# Patient Record
Sex: FEMALE | Race: WHITE | Employment: OTHER | URBAN - NONMETROPOLITAN AREA
[De-identification: names, ages, dates, MRNs, and addresses within clinical notes are randomized per-mention and may not be internally consistent; named-entity substitution may affect disease eponyms.]

---

## 2020-01-01 ENCOUNTER — OFFICE VISIT (OUTPATIENT)
Dept: FAMILY MEDICINE | Facility: OTHER | Age: 75
End: 2020-01-01
Attending: NURSE PRACTITIONER
Payer: MEDICARE

## 2020-01-01 ENCOUNTER — TELEPHONE (OUTPATIENT)
Dept: FAMILY MEDICINE | Facility: OTHER | Age: 75
End: 2020-01-01

## 2020-01-01 ENCOUNTER — MEDICAL CORRESPONDENCE (OUTPATIENT)
Dept: HEALTH INFORMATION MANAGEMENT | Facility: CLINIC | Age: 75
End: 2020-01-01

## 2020-01-01 ENCOUNTER — TRANSFERRED RECORDS (OUTPATIENT)
Dept: HEALTH INFORMATION MANAGEMENT | Facility: CLINIC | Age: 75
End: 2020-01-01

## 2020-01-01 ENCOUNTER — OFFICE VISIT (OUTPATIENT)
Dept: DERMATOLOGY | Facility: OTHER | Age: 75
End: 2020-01-01
Attending: DERMATOLOGY
Payer: MEDICARE

## 2020-01-01 ENCOUNTER — HOSPITAL ENCOUNTER (EMERGENCY)
Facility: HOSPITAL | Age: 75
Discharge: HOME | End: 2020-08-06
Attending: NURSE PRACTITIONER | Admitting: NURSE PRACTITIONER
Payer: MEDICARE

## 2020-01-01 ENCOUNTER — OFFICE VISIT (OUTPATIENT)
Dept: FAMILY MEDICINE | Facility: OTHER | Age: 75
End: 2020-01-01
Attending: FAMILY MEDICINE
Payer: MEDICARE

## 2020-01-01 VITALS
HEART RATE: 65 BPM | WEIGHT: 158 LBS | BODY MASS INDEX: 23.95 KG/M2 | DIASTOLIC BLOOD PRESSURE: 86 MMHG | RESPIRATION RATE: 16 BRPM | HEIGHT: 68 IN | SYSTOLIC BLOOD PRESSURE: 142 MMHG | OXYGEN SATURATION: 96 % | TEMPERATURE: 97.2 F

## 2020-01-01 VITALS — DIASTOLIC BLOOD PRESSURE: 70 MMHG | SYSTOLIC BLOOD PRESSURE: 128 MMHG | OXYGEN SATURATION: 97 % | HEART RATE: 70 BPM

## 2020-01-01 VITALS
DIASTOLIC BLOOD PRESSURE: 60 MMHG | RESPIRATION RATE: 16 BRPM | TEMPERATURE: 97.5 F | OXYGEN SATURATION: 98 % | SYSTOLIC BLOOD PRESSURE: 114 MMHG

## 2020-01-01 VITALS
HEIGHT: 68 IN | DIASTOLIC BLOOD PRESSURE: 60 MMHG | HEART RATE: 74 BPM | WEIGHT: 153 LBS | SYSTOLIC BLOOD PRESSURE: 120 MMHG | TEMPERATURE: 96.2 F | BODY MASS INDEX: 23.19 KG/M2 | OXYGEN SATURATION: 96 %

## 2020-01-01 DIAGNOSIS — G47.00 INSOMNIA, UNSPECIFIED TYPE: ICD-10-CM

## 2020-01-01 DIAGNOSIS — L98.9 SCALP LESION: ICD-10-CM

## 2020-01-01 DIAGNOSIS — I10 HYPERTENSION, UNSPECIFIED TYPE: ICD-10-CM

## 2020-01-01 DIAGNOSIS — G25.81 RESTLESS LEGS SYNDROME: ICD-10-CM

## 2020-01-01 DIAGNOSIS — M54.50 CHRONIC LOW BACK PAIN, UNSPECIFIED BACK PAIN LATERALITY, UNSPECIFIED WHETHER SCIATICA PRESENT: ICD-10-CM

## 2020-01-01 DIAGNOSIS — M54.50 LOWER BACK PAIN: ICD-10-CM

## 2020-01-01 DIAGNOSIS — R25.1 TREMOR: ICD-10-CM

## 2020-01-01 DIAGNOSIS — E78.00 HYPERCHOLESTEROLEMIA: ICD-10-CM

## 2020-01-01 DIAGNOSIS — G89.29 CHRONIC LOW BACK PAIN, UNSPECIFIED BACK PAIN LATERALITY, UNSPECIFIED WHETHER SCIATICA PRESENT: ICD-10-CM

## 2020-01-01 DIAGNOSIS — Z23 NEED FOR PROPHYLACTIC VACCINATION AND INOCULATION AGAINST INFLUENZA: Primary | ICD-10-CM

## 2020-01-01 DIAGNOSIS — Z86.69 HX OF ENCEPHALOPATHY: Primary | ICD-10-CM

## 2020-01-01 DIAGNOSIS — G89.29 CHRONIC LOW BACK PAIN, UNSPECIFIED BACK PAIN LATERALITY, UNSPECIFIED WHETHER SCIATICA PRESENT: Primary | ICD-10-CM

## 2020-01-01 DIAGNOSIS — M54.50 CHRONIC LOW BACK PAIN, UNSPECIFIED BACK PAIN LATERALITY, UNSPECIFIED WHETHER SCIATICA PRESENT: Primary | ICD-10-CM

## 2020-01-01 DIAGNOSIS — G25.81 RESTLESS LEGS SYNDROME: Primary | ICD-10-CM

## 2020-01-01 DIAGNOSIS — D48.5 NEOPLASM OF UNCERTAIN BEHAVIOR OF SKIN: Primary | ICD-10-CM

## 2020-01-01 LAB
ALBUMIN SERPL-MCNC: 3.4 G/DL (ref 3.4–5)
ALBUMIN SERPL-MCNC: 3.9 G/DL (ref 3.4–5)
ALP SERPL-CCNC: 173 U/L (ref 40–150)
ALP SERPL-CCNC: 331 U/L (ref 40–150)
ALT SERPL W P-5'-P-CCNC: 43 U/L (ref 0–50)
ALT SERPL W P-5'-P-CCNC: 72 U/L (ref 0–50)
ANION GAP SERPL CALCULATED.3IONS-SCNC: 3 MMOL/L (ref 3–14)
ANION GAP SERPL CALCULATED.3IONS-SCNC: 7 MMOL/L (ref 3–14)
AST SERPL W P-5'-P-CCNC: 30 U/L (ref 0–45)
AST SERPL W P-5'-P-CCNC: 58 U/L (ref 0–45)
BASOPHILS # BLD AUTO: 0.1 10E9/L (ref 0–0.2)
BASOPHILS NFR BLD AUTO: 1.1 %
BILIRUB SERPL-MCNC: 0.3 MG/DL (ref 0.2–1.3)
BILIRUB SERPL-MCNC: 0.4 MG/DL (ref 0.2–1.3)
BUN SERPL-MCNC: 15 MG/DL (ref 7–30)
BUN SERPL-MCNC: 29 MG/DL (ref 7–30)
CALCIUM SERPL-MCNC: 9.2 MG/DL (ref 8.5–10.1)
CALCIUM SERPL-MCNC: 9.3 MG/DL (ref 8.5–10.1)
CHLORIDE SERPL-SCNC: 106 MMOL/L (ref 94–109)
CHLORIDE SERPL-SCNC: 106 MMOL/L (ref 94–109)
CHOLEST SERPL-MCNC: 228 MG/DL
CO2 SERPL-SCNC: 25 MMOL/L (ref 20–32)
CO2 SERPL-SCNC: 28 MMOL/L (ref 20–32)
COPATH REPORT: NORMAL
CREAT SERPL-MCNC: 0.58 MG/DL (ref 0.52–1.04)
CREAT SERPL-MCNC: 0.72 MG/DL (ref 0.52–1.04)
DIFFERENTIAL METHOD BLD: ABNORMAL
EOSINOPHIL # BLD AUTO: 0.1 10E9/L (ref 0–0.7)
EOSINOPHIL NFR BLD AUTO: 1.5 %
ERYTHROCYTE [DISTWIDTH] IN BLOOD BY AUTOMATED COUNT: 14.8 % (ref 10–15)
GFR SERPL CREATININE-BSD FRML MDRD: 82 ML/MIN/{1.73_M2}
GFR SERPL CREATININE-BSD FRML MDRD: >90 ML/MIN/{1.73_M2}
GLUCOSE SERPL-MCNC: 101 MG/DL (ref 70–99)
GLUCOSE SERPL-MCNC: 107 MG/DL (ref 70–99)
HCT VFR BLD AUTO: 41 % (ref 35–47)
HDLC SERPL-MCNC: 93 MG/DL
HGB BLD-MCNC: 13.4 G/DL (ref 11.7–15.7)
IMM GRANULOCYTES # BLD: 0.1 10E9/L (ref 0–0.4)
IMM GRANULOCYTES NFR BLD: 0.8 %
LDLC SERPL CALC-MCNC: 118 MG/DL
LYMPHOCYTES # BLD AUTO: 1.8 10E9/L (ref 0.8–5.3)
LYMPHOCYTES NFR BLD AUTO: 18.4 %
MCH RBC QN AUTO: 29.8 PG (ref 26.5–33)
MCHC RBC AUTO-ENTMCNC: 32.7 G/DL (ref 31.5–36.5)
MCV RBC AUTO: 91 FL (ref 78–100)
MONOCYTES # BLD AUTO: 0.7 10E9/L (ref 0–1.3)
MONOCYTES NFR BLD AUTO: 7.4 %
NEUTROPHILS # BLD AUTO: 6.7 10E9/L (ref 1.6–8.3)
NEUTROPHILS NFR BLD AUTO: 70.8 %
NONHDLC SERPL-MCNC: 135 MG/DL
NRBC # BLD AUTO: 0 10*3/UL
NRBC BLD AUTO-RTO: 0 /100
PLATELET # BLD AUTO: 456 10E9/L (ref 150–450)
POTASSIUM SERPL-SCNC: 4 MMOL/L (ref 3.4–5.3)
POTASSIUM SERPL-SCNC: 4.2 MMOL/L (ref 3.4–5.3)
PROT SERPL-MCNC: 7.3 G/DL (ref 6.8–8.8)
PROT SERPL-MCNC: 8 G/DL (ref 6.8–8.8)
RBC # BLD AUTO: 4.49 10E12/L (ref 3.8–5.2)
SODIUM SERPL-SCNC: 137 MMOL/L (ref 133–144)
SODIUM SERPL-SCNC: 138 MMOL/L (ref 133–144)
TRIGL SERPL-MCNC: 87 MG/DL
TSH SERPL DL<=0.005 MIU/L-ACNC: 2.88 MU/L (ref 0.4–4)
WBC # BLD AUTO: 9.5 10E9/L (ref 4–11)

## 2020-01-01 PROCEDURE — 99441 ZZC PHYSICIAN TELEPHONE EVALUATION 5-10 MIN: CPT | Performed by: FAMILY MEDICINE

## 2020-01-01 PROCEDURE — 99214 OFFICE O/P EST MOD 30 MIN: CPT | Performed by: FAMILY MEDICINE

## 2020-01-01 PROCEDURE — 11104 PUNCH BX SKIN SINGLE LESION: CPT | Performed by: DERMATOLOGY

## 2020-01-01 PROCEDURE — 99201 PR OFFICE/OUTPT VISIT, NEW, LEVEL I: CPT | Mod: 25 | Performed by: DERMATOLOGY

## 2020-01-01 PROCEDURE — 25000128 H RX IP 250 OP 636: Performed by: NURSE PRACTITIONER

## 2020-01-01 PROCEDURE — 99441 ZZC PHYSICIAN TELEPHONE EVALUATION 5-10 MIN: CPT | Mod: 95 | Performed by: FAMILY MEDICINE

## 2020-01-01 PROCEDURE — 85025 COMPLETE CBC W/AUTO DIFF WBC: CPT | Mod: ZL | Performed by: FAMILY MEDICINE

## 2020-01-01 PROCEDURE — 36415 COLL VENOUS BLD VENIPUNCTURE: CPT | Mod: ZL | Performed by: FAMILY MEDICINE

## 2020-01-01 PROCEDURE — 90662 IIV NO PRSV INCREASED AG IM: CPT

## 2020-01-01 PROCEDURE — 88305 TISSUE EXAM BY PATHOLOGIST: CPT | Mod: TC | Performed by: DERMATOLOGY

## 2020-01-01 PROCEDURE — 99204 OFFICE O/P NEW MOD 45 MIN: CPT | Performed by: FAMILY MEDICINE

## 2020-01-01 PROCEDURE — 96372 THER/PROPH/DIAG INJ SC/IM: CPT

## 2020-01-01 PROCEDURE — G0463 HOSPITAL OUTPT CLINIC VISIT: HCPCS | Mod: 25

## 2020-01-01 PROCEDURE — G0463 HOSPITAL OUTPT CLINIC VISIT: HCPCS

## 2020-01-01 PROCEDURE — 84443 ASSAY THYROID STIM HORMONE: CPT | Mod: ZL | Performed by: FAMILY MEDICINE

## 2020-01-01 PROCEDURE — 80053 COMPREHEN METABOLIC PANEL: CPT | Mod: ZL | Performed by: FAMILY MEDICINE

## 2020-01-01 PROCEDURE — 90471 IMMUNIZATION ADMIN: CPT | Performed by: FAMILY MEDICINE

## 2020-01-01 PROCEDURE — 99203 OFFICE O/P NEW LOW 30 MIN: CPT | Mod: Z6 | Performed by: NURSE PRACTITIONER

## 2020-01-01 PROCEDURE — 80061 LIPID PANEL: CPT | Mod: ZL | Performed by: FAMILY MEDICINE

## 2020-01-01 RX ORDER — BACLOFEN 20 MG/1
20 TABLET ORAL
Status: CANCELLED | OUTPATIENT
Start: 2020-01-01

## 2020-01-01 RX ORDER — SIMVASTATIN 20 MG
20 TABLET ORAL
COMMUNITY
Start: 2020-01-20 | End: 2020-01-01

## 2020-01-01 RX ORDER — ZOLPIDEM TARTRATE 5 MG/1
TABLET ORAL
Qty: 30 TABLET | Refills: 0 | Status: SHIPPED | OUTPATIENT
Start: 2020-01-01 | End: 2020-01-01

## 2020-01-01 RX ORDER — ZOLPIDEM TARTRATE 6.25 MG/1
TABLET, FILM COATED, EXTENDED RELEASE ORAL
Qty: 30 TABLET | Refills: 0 | Status: SHIPPED | OUTPATIENT
Start: 2020-01-01 | End: 2020-01-01

## 2020-01-01 RX ORDER — ZOLPIDEM TARTRATE 5 MG/1
5 TABLET ORAL
Qty: 30 TABLET | Refills: 0 | Status: SHIPPED | OUTPATIENT
Start: 2020-01-01 | End: 2020-01-01

## 2020-01-01 RX ORDER — GABAPENTIN 100 MG/1
300 CAPSULE ORAL AT BEDTIME
Qty: 90 CAPSULE | Refills: 3 | Status: SHIPPED | OUTPATIENT
Start: 2020-01-01 | End: 2020-01-01

## 2020-01-01 RX ORDER — ZOLPIDEM TARTRATE 6.25 MG/1
TABLET, FILM COATED, EXTENDED RELEASE ORAL
Qty: 30 TABLET | Refills: 0 | Status: SHIPPED | OUTPATIENT
Start: 2020-01-01 | End: 2021-01-01

## 2020-01-01 RX ORDER — ZOLPIDEM TARTRATE 10 MG/1
TABLET ORAL
Qty: 30 TABLET | Refills: 0 | Status: SHIPPED | OUTPATIENT
Start: 2020-01-01

## 2020-01-01 RX ORDER — GABAPENTIN 100 MG/1
CAPSULE ORAL
Qty: 90 CAPSULE | Refills: 0 | Status: SHIPPED | OUTPATIENT
Start: 2020-01-01 | End: 2020-01-01

## 2020-01-01 RX ORDER — BACLOFEN 20 MG/1
TABLET ORAL
Qty: 60 TABLET | Refills: 0 | Status: SHIPPED | OUTPATIENT
Start: 2020-01-01 | End: 2021-01-01

## 2020-01-01 RX ORDER — ROPINIROLE 0.25 MG/1
TABLET, FILM COATED ORAL
Qty: 60 TABLET | Refills: 3 | Status: SHIPPED | OUTPATIENT
Start: 2020-01-01

## 2020-01-01 RX ORDER — ENALAPRIL MALEATE 10 MG/1
10 TABLET ORAL DAILY
Qty: 90 TABLET | Refills: 3 | Status: SHIPPED | OUTPATIENT
Start: 2020-01-01

## 2020-01-01 RX ORDER — HYDROCHLOROTHIAZIDE 12.5 MG/1
12.5 CAPSULE ORAL
COMMUNITY
Start: 2019-05-20 | End: 2020-01-01

## 2020-01-01 RX ORDER — ZOLPIDEM TARTRATE 10 MG/1
TABLET ORAL
Qty: 30 TABLET | Refills: 0 | Status: SHIPPED | OUTPATIENT
Start: 2020-01-01 | End: 2020-01-01

## 2020-01-01 RX ORDER — BACLOFEN 20 MG/1
20 TABLET ORAL 2 TIMES DAILY PRN
Qty: 60 TABLET | Refills: 0 | Status: SHIPPED | OUTPATIENT
Start: 2020-01-01 | End: 2020-01-01

## 2020-01-01 RX ORDER — VITAMIN E 268 MG
400 CAPSULE ORAL
COMMUNITY

## 2020-01-01 RX ORDER — PREDNISONE 20 MG/1
TABLET ORAL
Qty: 10 TABLET | Refills: 0 | Status: SHIPPED | OUTPATIENT
Start: 2020-01-01 | End: 2020-01-01

## 2020-01-01 RX ORDER — GABAPENTIN 100 MG/1
100 CAPSULE ORAL
COMMUNITY
Start: 2020-01-20 | End: 2020-01-01

## 2020-01-01 RX ORDER — ZOLPIDEM TARTRATE 5 MG/1
5 TABLET ORAL
COMMUNITY
Start: 2019-12-10 | End: 2020-01-01 | Stop reason: ALTCHOICE

## 2020-01-01 RX ORDER — ZOLPIDEM TARTRATE 10 MG/1
10 TABLET ORAL
Qty: 30 TABLET | Refills: 0 | Status: SHIPPED | OUTPATIENT
Start: 2020-01-01 | End: 2020-01-01

## 2020-01-01 RX ORDER — ZOLPIDEM TARTRATE 6.25 MG/1
6.25 TABLET, FILM COATED, EXTENDED RELEASE ORAL
Qty: 30 TABLET | Refills: 0 | Status: SHIPPED | OUTPATIENT
Start: 2020-01-01 | End: 2020-01-01

## 2020-01-01 RX ORDER — TIZANIDINE 2 MG/1
TABLET ORAL
Qty: 15 TABLET | Refills: 0 | Status: SHIPPED | OUTPATIENT
Start: 2020-01-01 | End: 2020-01-01

## 2020-01-01 RX ORDER — KETOROLAC TROMETHAMINE 15 MG/ML
15 INJECTION, SOLUTION INTRAMUSCULAR; INTRAVENOUS ONCE
Status: COMPLETED | OUTPATIENT
Start: 2020-01-01 | End: 2020-01-01

## 2020-01-01 RX ORDER — HYDROCHLOROTHIAZIDE 12.5 MG/1
12.5 CAPSULE ORAL
COMMUNITY
Start: 2019-05-20

## 2020-01-01 RX ORDER — AMLODIPINE BESYLATE 10 MG/1
10 TABLET ORAL
COMMUNITY
Start: 2020-01-20 | End: 2021-01-01

## 2020-01-01 RX ORDER — AMLODIPINE BESYLATE 5 MG/1
TABLET ORAL
COMMUNITY
Start: 2019-12-03 | End: 2020-01-01

## 2020-01-01 RX ORDER — BACLOFEN 20 MG/1
20 TABLET ORAL
COMMUNITY
Start: 2020-01-01 | End: 2020-01-01

## 2020-01-01 RX ORDER — METOPROLOL TARTRATE 100 MG
100 TABLET ORAL DAILY
COMMUNITY
Start: 2020-01-01

## 2020-01-01 RX ORDER — POTASSIUM CHLORIDE 1500 MG/1
20 TABLET, EXTENDED RELEASE ORAL
COMMUNITY
Start: 2020-01-01 | End: 2021-03-10

## 2020-01-01 RX ORDER — GABAPENTIN 100 MG/1
CAPSULE ORAL
Qty: 90 CAPSULE | Refills: 0 | Status: SHIPPED | OUTPATIENT
Start: 2020-01-01 | End: 2021-01-01

## 2020-01-01 RX ORDER — SIMVASTATIN 20 MG
20 TABLET ORAL AT BEDTIME
Qty: 90 TABLET | Refills: 3 | Status: SHIPPED | OUTPATIENT
Start: 2020-01-01

## 2020-01-01 RX ORDER — ENALAPRIL MALEATE 10 MG/1
10 TABLET ORAL
COMMUNITY
Start: 2019-05-20 | End: 2020-01-01

## 2020-01-01 RX ADMIN — KETOROLAC TROMETHAMINE 15 MG: 15 INJECTION, SOLUTION INTRAMUSCULAR; INTRAVENOUS at 12:41

## 2020-01-01 ASSESSMENT — ENCOUNTER SYMPTOMS
NUMBNESS: 0
CHILLS: 0
ABDOMINAL PAIN: 0
FEVER: 0
PARESTHESIAS: 0
SHORTNESS OF BREATH: 0
SLEEP DISTURBANCE: 1
BACK PAIN: 1
WEAKNESS: 1
PALPITATIONS: 0

## 2020-01-01 ASSESSMENT — MIFFLIN-ST. JEOR
SCORE: 1270.18
SCORE: 1242.5

## 2020-01-01 ASSESSMENT — PAIN SCALES - GENERAL
PAINLEVEL: NO PAIN (0)

## 2020-03-11 NOTE — TELEPHONE ENCOUNTER
[facility].Pt went home yesterday from [facility].     She has forest appt with  to establish care next Thursday.     Cleo wondering if she could get an order to admit to homecare now?      Call back Cleo at [phone].

## 2020-03-11 NOTE — TELEPHONE ENCOUNTER
Verbal orders given to admit to homecare by .Given to Aniyah at [facility].    Chaparrita Osei RN

## 2020-03-11 NOTE — TELEPHONE ENCOUNTER
Called [facility] to clarify what is needed and need verbal order from MD to admit to homecare.    Chaparrita Osei RN

## 2020-03-13 NOTE — TELEPHONE ENCOUNTER
Junior from [facility] called, need verbal orders for skilled nursing once a week for one week, twice a week for one week, and then one time a week for seven weeks.     [phone]

## 2020-03-17 PROBLEM — G25.81 RESTLESS LEGS SYNDROME: Status: ACTIVE | Noted: 2020-01-01

## 2020-03-17 PROBLEM — Z79.899 CONTROLLED SUBSTANCE AGREEMENT SIGNED: Status: ACTIVE | Noted: 2018-08-28

## 2020-03-17 PROBLEM — G93.40 ACUTE ENCEPHALOPATHY: Status: ACTIVE | Noted: 2020-01-01

## 2020-03-17 PROBLEM — F17.200 CURRENT SMOKER: Status: ACTIVE | Noted: 2019-05-07

## 2020-03-17 PROBLEM — G47.00 INSOMNIA, UNSPECIFIED TYPE: Status: ACTIVE | Noted: 2020-01-01

## 2020-03-17 PROBLEM — L98.9 CHANGING SKIN LESION: Status: ACTIVE | Noted: 2018-09-17

## 2020-03-17 PROBLEM — Z86.69 HX OF ENCEPHALOPATHY: Status: ACTIVE | Noted: 2020-01-01

## 2020-03-17 NOTE — PROGRESS NOTES
Subjective     Soumya Boo is a 74 year old female who presents to clinic today for the following health issues:    \[facility]\""       Hospital Follow-up Visit:    Hospital/Nursing Home/IP Rehab Facility: [facility]  Date of Admission: 3/8/2020  Date of Discharge: 3/10/2020  Reason(s) for Admission: BCC of scalp, acute encephalopathy            Problems taking medications regularly:  None       Medication changes since discharge: Potassium-chloride       Problems adhering to non-medication therapy:  None    Summary of hospitalization:  [facility] discharge summary reviewed  Diagnostic Tests/Treatments reviewed.  Follow up needed: none  Other Healthcare Providers Involved in Patient s Care:         None  Update since discharge: improved.     Post Discharge Medication Reconciliation: discharge medications reconciled, continue medications without change.  Plan of care communicated with patient and family     Coding guidelines for this visit:  Type of Medical   Decision Making Face-to-Face Visit       within 7 Days of discharge Face-to-Face Visit        within 14 days of discharge   Moderate Complexity [id number]   High Complexity [id number]                    PAST MEDICAL HISTORY:  Past Medical History:   Diagnosis Date     Basal cell carcinoma      Hypercholesteremia      Hypercholesterolemia 12/19/2013    Other and unspecified hyperlipidemia ([facility])     Hypertension      Osteoporosis      Restless legs syndrome 3/17/2020       PAST SURGICAL HISTORY:  Past Surgical History:   Procedure Laterality Date     BIOPSY OF SKIN LESION      face     HYSTERECTOMY, PAP STILL INDICATED       wisdom teeth         MEDICATIONS:  Prior to Admission medications    Medication Sig Start Date End Date Taking? Authorizing Provider   acetaminophen-codeine (TYLENOL #3) 300-30 MG tablet TAKE 1 TABLET BY MOUTH ONCE DAILY AS NEEDED FOR PAIN 3/11/20  Yes Reported, Patient   amLODIPine (NORVASC) 10 MG tablet Take 10 mg by mouth 1/20/20  "1/19/21 Yes Reported, Patient   baclofen (LIORESAL) 20 MG tablet Take 20 mg by mouth 3/11/20  Yes Reported, Patient   Cholecalciferol (VITAMIN D3) 50 MCG (2000 UT) CHEW Take 2,000 Units by mouth 9/17/18  Yes Reported, Patient   enalapril (VASOTEC) 10 MG tablet Take 1 tablet (10 mg) by mouth daily 3/17/20  Yes ANA LAURA Mcguire MD   gabapentin (NEURONTIN) 100 MG capsule Take 100 mg by mouth 1/20/20 1/19/21 Yes Reported, Patient   gabapentin (NEURONTIN) 100 MG capsule Take 3 capsules (300 mg) by mouth At Bedtime 3/17/20  Yes ANA LAURA Mcguire MD   hydrochlorothiazide (MICROZIDE) 12.5 MG capsule Take 12.5 mg by mouth 5/20/19 5/19/20 Yes Reported, Patient   metoprolol tartrate (LOPRESSOR) 100 MG tablet Take 100 mg by mouth daily 3/11/20  Yes Reported, Patient   Multiple Vitamins-Minerals (CENTRUM-LUTEIN OR) 1 tablet   Yes Reported, Patient   potassium chloride ER (KLOR-CON M) 20 MEQ CR tablet Take 20 mEq by mouth 3/10/20 3/10/21 Yes Reported, Patient   simvastatin (ZOCOR) 20 MG tablet Take 1 tablet (20 mg) by mouth At Bedtime 3/17/20  Yes ANA LAURA Mcguire MD   vitamin E (TOCOPHEROL) 400 units (360 mg) capsule Take 400 Units by mouth   Yes Reported, Patient   zolpidem (AMBIEN) 5 MG tablet Take 5 mg by mouth 12/10/19  Yes Reported, Patient   zolpidem (AMBIEN) 5 MG tablet Take 1 tablet (5 mg) by mouth nightly as needed for sleep 3/17/20  Yes ANA LAURA Mcguire MD       ALLERGIES:     Allergies   Allergen Reactions     Latex Rash     Extended periods of time.--Bandaids especially       ROS:  Constitutional, neuro, ENT, endocrine, pulmonary, cardiac, gastrointestinal, genitourinary, musculoskeletal, integument and psychiatric systems are negative, except as otherwise noted.      EXAM:  BP (!) 142/86   Pulse 65   Temp 97.2  F (36.2  C) (Tympanic)   Resp 16   Ht 1.727 m (5' 8\")   Wt 71.7 kg (158 lb)   SpO2 96%   BMI 24.02 kg/m   Body mass index is 24.02 kg/m .   GENERAL APPEARANCE: healthy, alert oriented x3 and no " distress  EYES: Eyes grossly normal to inspection, PERRL and conjunctivae and sclerae normal  NECK: no adenopathy, no asymmetry, masses, or scars and thyroid normal to palpation  RESP: lungs clear to auscultation - no rales, rhonchi or wheezes  CV: regular rates and rhythm, normal S1 S2, no S3 or S4 and no murmur, click or rub  NEURO: Normal strength and tone, mentation intact and speech normal  PSYCH: mentation appears normal and affect normal  Lab/ X-ray  Results for orders placed or performed in visit on 03/17/20 (from the past 24 hour(s))   CBC with platelets and differential   Result Value Ref Range    WBC 9.5 4.0 - 11.0 10e9/L    RBC Count 4.49 3.8 - 5.2 10e12/L    Hemoglobin 13.4 11.7 - 15.7 g/dL    Hematocrit 41.0 35.0 - 47.0 %    MCV 91 78 - 100 fl    MCH 29.8 26.5 - 33.0 pg    MCHC 32.7 31.5 - 36.5 g/dL    RDW 14.8 10.0 - 15.0 %    Platelet Count 456 (H) 150 - 450 10e9/L    Diff Method Automated Method     % Neutrophils 70.8 %    % Lymphocytes 18.4 %    % Monocytes 7.4 %    % Eosinophils 1.5 %    % Basophils 1.1 %    % Immature Granulocytes 0.8 %    Nucleated RBCs 0 0 /100    Absolute Neutrophil 6.7 1.6 - 8.3 10e9/L    Absolute Lymphocytes 1.8 0.8 - 5.3 10e9/L    Absolute Monocytes 0.7 0.0 - 1.3 10e9/L    Absolute Eosinophils 0.1 0.0 - 0.7 10e9/L    Absolute Basophils 0.1 0.0 - 0.2 10e9/L    Abs Immature Granulocytes 0.1 0 - 0.4 10e9/L    Absolute Nucleated RBC 0.0    Comprehensive metabolic panel (BMP + Alb, Alk Phos, ALT, AST, Total. Bili, TP)   Result Value Ref Range    Sodium 137 133 - 144 mmol/L    Potassium 4.2 3.4 - 5.3 mmol/L    Chloride 106 94 - 109 mmol/L    Carbon Dioxide 28 20 - 32 mmol/L    Anion Gap 3 3 - 14 mmol/L    Glucose 107 (H) 70 - 99 mg/dL    Urea Nitrogen 15 7 - 30 mg/dL    Creatinine 0.72 0.52 - 1.04 mg/dL    GFR Estimate 82 >60 mL/min/[1.73_m2]    GFR Estimate If Black >90 >60 mL/min/[1.73_m2]    Calcium 9.2 8.5 - 10.1 mg/dL    Bilirubin Total 0.3 0.2 - 1.3 mg/dL    Albumin 3.4 3.4  - 5.0 g/dL    Protein Total 7.3 6.8 - 8.8 g/dL    Alkaline Phosphatase 173 (H) 40 - 150 U/L    ALT 43 0 - 50 U/L    AST 30 0 - 45 U/L       ASSESSMENT/PLAN:    ICD-10-CM    1. Hx of encephalopathy  Z86.69 CBC with platelets and differential     Comprehensive metabolic panel (BMP + Alb, Alk Phos, ALT, AST, Total. Bili, TP)     CANCELED: UA reflex to Microscopic and Culture - [place]   2. Hypertension, unspecified type  I10 enalapril (VASOTEC) 10 MG tablet   3. Restless legs syndrome  G25.81 gabapentin (NEURONTIN) 100 MG capsule   4. Insomnia, unspecified type  G47.00 zolpidem (AMBIEN) 5 MG tablet   5. Hypercholesterolemia  E78.00 simvastatin (ZOCOR) 20 MG tablet     She had an acute encephalopathy.  Imaging studies of her brain showed no acute event.  She was dehydrated and had a urinary infection got antibiotics her mentation cleared her Vasotec was held while she was in the hospital.  Currently now she has no headache vision change chest pain shortness of breath dysuria trouble stooling.  She feels a little bit weak but considering how severely ill she was she is fortunate to be alive.  She is here with her sister wants her Vasotec renewed does have restless legs Neurontin worked in the past will restart that she has insomnia low-dose Ambien has helped her in the past and she needs her Zocor refilled.  We did check CBC and CMP today she is unable to leave a urine specimen.  We will see her in a couple months to recheck blood pressure and will repeat labs her alk phos was a little elevated today.  We will not see ear sooner just because of the coronavirus and try to keep her away from other sick people for now.  She was living in [place] and was hospitalized at [facility] in [place] and now is up here with her sister will be moving up here.    QI Mcguire MD  March 17, 2020

## 2020-03-17 NOTE — NURSING NOTE
"Chief Complaint   Patient presents with     Hospital F/U       Initial BP (!) 142/86   Pulse 65   Temp 97.2  F (36.2  C) (Tympanic)   Resp 16   Ht 1.727 m (5' 8\")   Wt 71.7 kg (158 lb)   SpO2 96%   BMI 24.02 kg/m   Estimated body mass index is 24.02 kg/m  as calculated from the following:    Height as of this encounter: 1.727 m (5' 8\").    Weight as of this encounter: 71.7 kg (158 lb).  Medication Reconciliation: complete  Nohemi Combs LPN  "

## 2020-03-18 NOTE — TELEPHONE ENCOUNTER
ANA LAURA Mcguire MD  You 3 hours ago (1:05 PM)       Im sorry it should be one at bedtime  (100 mg)  [place]

## 2020-03-18 NOTE — TELEPHONE ENCOUNTER
Patient called in regards to Gabapentin prescription that was prescribed 03/17/20. Patient would like clarification on prescription. Patient states Dr. Mcguire told her to take on tablet by mouth every night. Current prescription on medication list states Gabapentin 100MG, take 3 capsules at bedtime. Patient would like a phone call back. Patient advised that provider was not in the office today but would be back tomorrow. Patient verbalized understanding.

## 2020-04-20 NOTE — TELEPHONE ENCOUNTER
zolpidem (AMBIEN) 5 MG tablet         Last Written Prescription Date:  3/17/20  Last Fill Quantity: 30,   # refills: 0  Last Office Visit: 3/17/20  Future Office visit:    Next 5 appointments (look out 90 days)    May 18, 2020  2:30 PM CDT  (Arrive by 2:15 PM)  SHORT with ANA LAURA [facility]guire [facility]  [facility] [address]  [place] [address]  [phone]           Routing refill request to provider for review/approval because:  Drug not on the [facility], [facility] or [facility] refill protocol or controlled substance

## 2020-05-11 NOTE — TELEPHONE ENCOUNTER
Received fax from [facility] in regards to Soumya Cheryarnav   :  1945     The following information was received via fax:    Patient was discharged from Home care services (skilled nursing and physical therapy) on 20. This was unplanned- patient DENIED care. Mayela Zheng LPN

## 2020-05-18 NOTE — PROGRESS NOTES
"Soumya Boo is a 74 year old female who is being evaluated via a billable telephone visit.      The patient has been notified of following:     \"This telephone visit will be conducted via a call between you and your physician/provider. We have found that certain health care needs can be provided without the need for a physical exam.  This service lets us provide the care you need with a short phone conversation.  If a prescription is necessary we can send it directly to your pharmacy.  If lab work is needed we can place an order for that and you can then stop by our lab to have the test done at a later time.    Telephone visits are billed at different rates depending on your insurance coverage. During this emergency period, for some insurers they may be billed the same as an in-person visit.  Please reach out to your insurance provider with any questions.    If during the course of the call the physician/provider feels a telephone visit is not appropriate, you will not be charged for this service.\"    Patient has given verbal consent for Telephone visit?  Yes    What phone number would you like to be contacted at? [phone]    How would you like to obtain your AVS? Mail a copy    Subjective     Soumya Boo is a 74 year old female who presents via phone visit today for the following health issues:    HPI  Hyperlipidemia Follow-Up      Are you regularly taking any medication or supplement to lower your cholesterol?   Yes- simvastatin    Are you having muscle aches or other side effects that you think could be caused by your cholesterol lowering medication?  No    Hypertension Follow-up      Do you check your blood pressure regularly outside of the clinic? No     Are you following a low salt diet? No    Are your blood pressures ever more than 140 on the top number (systolic) OR more   than 90 on the bottom number (diastolic), for example 140/90? Yes    Insomnia      Duration: years    Description  Frequency of " insomnia:  nightly  Time to fall asleep: over 1 hour  Middle of night awakening:  none  Early morning awakenin-2 times a week    Accompanying signs and symptoms:  restless legs and fatigue    History  Similar episodes in past:  YES  Previous evaluation/sleep study:  no     Precipitating or alleviating factors:  New stressful situation: no   Caffeine intake after lunchtime: YES  OTC decongestants: no   Any new medications: no     Therapies tried and outcome: Ambien -  usually effective         How many servings of fruits and vegetables do you eat daily?  2-3    On average, how many sweetened beverages do you drink each day (Examples: soda, juice, sweet tea, etc.  Do NOT count diet or artificially sweetened beverages)?   0    How many days per week do you exercise enough to make your heart beat faster? 7    How many minutes a day do you exercise enough to make your heart beat faster? 20 - 29    How many days per week do you miss taking your medication? 0             Patient Active Problem List   Diagnosis     Acute encephalopathy     Controlled substance agreement signed     Basal cell carcinoma of skin     Cancer of parotid gland (H)     Changing skin lesion     Current smoker     Elevated LFTs     Fatty liver     Fibrocystic breast     HTN (hypertension)     Hypercholesterolemia     Iron deficiency anemia     Scoliosis associated with other condition     Hx of encephalopathy     Restless legs syndrome     Insomnia, unspecified type     Past Surgical History:   Procedure Laterality Date     BIOPSY OF SKIN LESION      face     HYSTERECTOMY, PAP STILL INDICATED       wisdom teeth         Social History     Tobacco Use     Smoking status: Current Every Day Smoker     Packs/day: 0.25     Years: 50.00     Pack years: 12.50     Smokeless tobacco: Never Used   Substance Use Topics     Alcohol use: Not on file     History reviewed. No pertinent family history.      Current Outpatient Medications   Medication Sig  Dispense Refill     acetaminophen-codeine (TYLENOL #3) 300-30 MG tablet TAKE 1 TABLET BY MOUTH ONCE DAILY AS NEEDED FOR PAIN       amLODIPine (NORVASC) 10 MG tablet Take 10 mg by mouth       baclofen (LIORESAL) 20 MG tablet Take 20 mg by mouth       Cholecalciferol (VITAMIN D3) 50 MCG (2000 UT) CHEW Take 2,000 Units by mouth       enalapril (VASOTEC) 10 MG tablet Take 1 tablet (10 mg) by mouth daily 90 tablet 3     gabapentin (NEURONTIN) 100 MG capsule Take 100 mg by mouth       gabapentin (NEURONTIN) 100 MG capsule Take 3 capsules (300 mg) by mouth At Bedtime 90 capsule 3     hydrochlorothiazide (MICROZIDE) 12.5 MG capsule Take 12.5 mg by mouth       metoprolol tartrate (LOPRESSOR) 100 MG tablet Take 100 mg by mouth daily       Multiple Vitamins-Minerals (CENTRUM-LUTEIN OR) 1 tablet       potassium chloride ER (KLOR-CON M) 20 MEQ CR tablet Take 20 mEq by mouth       rOPINIRole (REQUIP) 0.25 MG tablet Take one at bedtime for one week then go to 2 at bedtime 60 tablet 3     simvastatin (ZOCOR) 20 MG tablet Take 1 tablet (20 mg) by mouth At Bedtime 90 tablet 3     vitamin E (TOCOPHEROL) 400 units (360 mg) capsule Take 400 Units by mouth       zolpidem (AMBIEN) 10 MG tablet Take 1 tablet (10 mg) by mouth nightly as needed for sleep 30 tablet 0     Allergies   Allergen Reactions     Latex Rash     Extended periods of time.--Bandaids especially       Reviewed and updated as needed this visit by Provider         Review of Systems   Constitutional, HEENT, cardiovascular, pulmonary, gi and gu systems are negative, except as otherwise noted.       Objective   Reported vitals:  There were no vitals taken for this visit.   healthy, alert and no distress  PSYCH: Alert and oriented times 3; coherent speech, normal   rate and volume, able to articulate logical thoughts, able   to abstract reason, no tangential thoughts, no hallucinations   or delusions  Her affect is normal  RESP: No cough, no audible wheezing, able to talk in  full sentences  Remainder of exam unable to be completed due to telephone visits    Diagnostic Test Results:  Labs reviewed in Epic        Assessment/Plan:  1. Restless legs syndrome    - rOPINIRole (REQUIP) 0.25 MG tablet; Take one at bedtime for one week then go to 2 at bedtime  Dispense: 60 tablet; Refill: 3  Patient is having problems with restless legs.  In March had a normal hemoglobin so doubt iron deficiency anemia.  We will start with Requip 1 tablet at bedtime then go to to the following weeks and will see if that does not improve.  2. Insomnia, unspecified type    - zolpidem (AMBIEN) 10 MG tablet; Take 1 tablet (10 mg) by mouth nightly as needed for sleep  Dispense: 30 tablet; Refill: 0  She also has troubles with insomnia and 5 mg Ambien does not clear it she has had 10 mg in the past and that helps.  3. Hypercholesterolemia  This fall we will see her for visit follow-up of these issues and she will come in fasting we can do labs follow-up hypercholesterolemia and hypertension.  Sounds like overall she is doing well and will call sooner if any acute issues    4. Hypertension, unspecified type    Note she describes some small bumps on her scalp she will try medicated shampoo and if that does not help she will need to do a face-to-face further evaluate          Phone call duration:  5 minutes    ANA LAURA Mcguire MD

## 2020-05-18 NOTE — NURSING NOTE
"No chief complaint on file.      Initial There were no vitals taken for this visit. Estimated body mass index is 24.02 kg/m  as calculated from the following:    Height as of 3/17/20: 1.727 m (5' 8\").    Weight as of 3/17/20: 71.7 kg (158 lb).  Medication Reconciliation: complete  Diane Ferguson LPN    "

## 2020-05-26 NOTE — TELEPHONE ENCOUNTER
ANA LAURA Mcguire MD  You 18 minutes ago (11:24 AM)       Have patient call 1 of the assisted living facilities and speak with the nurse and see if they could set up an appointment    Message text      Patient called and patient stated she does not want to go to assisted living. Patient stated she would call when she is ready to do that route.

## 2020-05-26 NOTE — TELEPHONE ENCOUNTER
Patient sister, Dorothy, called very concerned for her sister. Patient's sister stated she is trying to live in an apartment alone. Sister states she feels this is not ok as she is too week. Patient's sister is requesting what can be done to help sister to go to an assisted living as she is not capable of living alone. Patient is wanting advise of Doctor. Please advise Can contact Dorothy [phone]

## 2020-06-12 NOTE — TELEPHONE ENCOUNTER
Zolpidem Tartrate 10 MG Oral Tablet       Last Written Prescription Date:  5/18/20  Last Fill Quantity: 30,   # refills: 0  Last Office Visit: 5/18/20  Future Office visit:       Routing refill request to provider for review/approval because:    Drug not on the [facility], [facility] or [facility] refill protocol or controlled substance

## 2020-07-10 NOTE — TELEPHONE ENCOUNTER
zolpidem (AMBIEN) 10 MG tablet       Last Written Prescription Date:  6-  Last Fill Quantity: 30,   # refills: 0  Last Office Visit: 5- virtual  Future Office visit:    Next 5 appointments (look out 90 days)    Sep 18, 2020  1:00 PM CDT  (Arrive by 12:45 PM)  SHORT with ANA LAURA Mcguire MD  [facility] ([facility] [place] ) [address]  [place] [address]  [phone]

## 2020-08-06 NOTE — ED AVS SNAPSHOT
[facility]  [address] [place] [address]  Phone:  [phone]                                    Soumya Boo   MRN: [id number]    Department:  [facility]   Date of Visit:  8/6/2020           After Visit Summary Signature Page    I have received my discharge instructions, and my questions have been answered. I have discussed any challenges I see with this plan with the nurse or doctor.    ..........................................................................................................................................  Patient/Patient Representative Signature      ..........................................................................................................................................  Patient Representative Print Name and Relationship to Patient    ..................................................               ................................................  Date                                   Time    ..........................................................................................................................................  Reviewed by Signature/Title    ...................................................              ..............................................  Date                                               Time          22EPIC Rev 08/18

## 2020-08-06 NOTE — ED PROVIDER NOTES
History     Chief Complaint   Patient presents with     Back Pain     HPI  Soumya Boo is a 75 year old female who is here with complaints of back pain.     Back is located over the left lower back, and started 6 days ago.  Describes the patient having bilateral lower back but more so on the left side.  As far as any new physical activity, states before she did experience this pain she did walk to the garbage can outside.  But denies any injury or obvious tweaking of the back at that time.    Denies bowel bladder dysfunction, saddle paresthesias, new onset of lower extremity weakness or paralysis. No fevers.    Home treatment: Excedrin 0900 today    She has had this chronic pain intermittently for the past 4 years after she had had injury and horse.  Denies fracture or surgery at that time.      Allergies:  Allergies   Allergen Reactions     Latex Rash     Extended periods of time.--Bandaids especially       Problem List:    Patient Active Problem List    Diagnosis Date Noted     Hx of encephalopathy 03/17/2020     Priority: Medium     Restless legs syndrome 03/17/2020     Priority: Medium     Insomnia, unspecified type 03/17/2020     Priority: Medium     Acute encephalopathy 03/07/2020     Priority: Medium     Current smoker 05/07/2019     Priority: Medium     Changing skin lesion 09/17/2018     Priority: Medium     Controlled substance agreement signed 08/28/2018     Priority: Medium     Date of Care Plan:  3/11/20  Nadine Castaneda PA-C  3/11/2020, 4:23 PM    Date Care Plan expires:  3/11/21    Background:   Pain Diagnosis:  BILATERAL LEG PAIN,  BACK PAIN  Prescription Monitoring Program was reviewed: Yes - Date:  8/28/18    Goals/Recommendations:  Should be seen in the clinic every 6 month(s).    Medication used as needed (prn) for breakthrough or intermittent pain:        TYLENOL #3'S CODEINE 300-30    This medication may be refilled every 1months for a total of 30 tablets    Refills:    If parameters are  met, the medication can be refilled.    All prescriptions should be filled at [facility] pharmacy.    Should be requested during the work week up to noon on Fridays.    If requested outside above parameters, patient should be instructed to make an appointment with me the next available clinic day.    ER & UC:  The Emergency Room and Urgent Care are not to be used for routine medical care or treatment of chronic pain.  Opioid medication will not be given in the ER or Urgent Care unless there is a medical emergency unrelated to chronic pain.       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     Priority: Medium     Basal cell carcinoma of skin 12/19/2013     Priority: Medium     Elevated LFTs 12/19/2013     Priority: Medium     Fatty liver 12/19/2013     Priority: Medium     Fibrocystic breast 12/19/2013     Priority: Medium     HTN (hypertension) 12/19/2013     Priority: Medium     Hypercholesterolemia 12/19/2013     Priority: Medium     Other and unspecified hyperlipidemia (HRC)       Iron deficiency anemia 12/19/2013     Priority: Medium     Scoliosis associated with other condition 12/19/2013     Priority: Medium        Past Medical History:    Past Medical History:   Diagnosis Date     Basal cell carcinoma      Hypercholesteremia      Hypercholesterolemia 12/19/2013     Hypertension      Osteoporosis      Restless legs syndrome 3/17/2020       Past Surgical History:    Past Surgical History:   Procedure Laterality Date     BIOPSY OF SKIN LESION      face     HYSTERECTOMY, PAP STILL INDICATED       wisdom teeth         Family History:    No family history on file.    Social History:  Marital Status:  Single [1]  Social History     Tobacco Use     Smoking status: Current Every Day Smoker     Packs/day: 0.25     Years: 50.00     Pack years: 12.50     Smokeless tobacco: Never Used   Substance Use Topics     Alcohol use: None     Drug use: Not Currently        Medications:    amLODIPine (NORVASC) 10 MG tablet  baclofen  (LIORESAL) 20 MG tablet  enalapril (VASOTEC) 10 MG tablet  gabapentin (NEURONTIN) 100 MG capsule  gabapentin (NEURONTIN) 100 MG capsule  metoprolol tartrate (LOPRESSOR) 100 MG tablet  Multiple Vitamins-Minerals (CENTRUM-LUTEIN OR)  potassium chloride ER (KLOR-CON M) 20 MEQ CR tablet  predniSONE (DELTASONE) 20 MG tablet  rOPINIRole (REQUIP) 0.25 MG tablet  simvastatin (ZOCOR) 20 MG tablet  tiZANidine (ZANAFLEX) 2 MG tablet  vitamin E (TOCOPHEROL) 400 units (360 mg) capsule  zolpidem (AMBIEN) 10 MG tablet  Cholecalciferol (VITAMIN D3) 50 MCG (2000 UT) CHEW          Review of Systems    ROS: 10 point ROS neg other than the symptoms noted above in the HPI.      Physical Exam   BP: 114/60  Heart Rate: 95  Temp: 97.5  F (36.4  C)(Excedrin this morning, flexeril also)  Resp: 16  SpO2: 98 %      Physical Exam  Vitals signs and nursing note reviewed.   Constitutional:       Appearance: Normal appearance.      Comments: Discomfort noted with movement and manipulation of the back.   Neck:      Musculoskeletal: Normal range of motion and neck supple. No neck rigidity.   Cardiovascular:      Rate and Rhythm: Normal rate and regular rhythm.   Pulmonary:      Effort: Pulmonary effort is normal.      Breath sounds: Normal breath sounds.   Musculoskeletal:        Arms:       Comments: Marked is the lower musculature bilateral back, but is worse on the left side.  Tenderness outpatient.  No obvious deformity, ecchymosis, erythema, swelling.    Overall she does have pretty good range of motion of the back but does limit a little due to the discomfort.  Lower extremities are strong and symmetric with present patellar reflexes.  CMS is intact.   Neurological:      Mental Status: She is alert.         ED Course     ED Course as of Aug 08 1331   Thu Aug 06, 2020   1312 Pt reports pain improved to 2/10. Appears more comfortable and is sitting in wheelchair in no acute distress.         Procedures      No results found for this or any  previous visit (from the past 24 hour(s)).    Medications   ketorolac (TORADOL) injection 15 mg (15 mg Intramuscular Given 8/6/20 1241)       Assessments & Plan (with Medical Decision Making)     I have reviewed the nursing notes.  I have reviewed the findings, diagnosis, plan and need for follow up with the patient.  (M54.5) Lower back pain  Comment: Recent flareup about chronic lower back muscle spasm.  This is bilateral lower back but more so on the left side.    No red flag back pain signs such as fever, new onset of bowel/bladder difficulties, or saddle paresthesias.   She was treated with 15 mg of Toradol in urgent care and she noted great relief in her pain.    Overall she is stable and is not in any acute distress and is okay to manage this at home.  We will send a prednisone taper to help with inflammation told her not to start it until tomorrow morning.  Did send in her low-dose tizanidine for her to take as needed telling her to take just 1-2 tabs more so at nighttime to help with her sleep.  Did discuss the potential side effects of this she verbalized her understanding.  Advised not to drive while taking this  Garcia further OTC analgesics as noted below.  Recommend walking as tolerated along with stretching and alternating heat and ice to the area.  Encouraged her to follow-up with her primary care provider in the next 5 to 7 days for reevaluation.    Recommend:  Start your prednisone tomorrow morning.  Complete the 5 days as directed.  Do not take any ibuprofen type products for the next 5 days.    You can take Tylenol which you can take 500 to 1000 mg every 4-6 hours.    Stop your baclofen.  You can take an as needed tizanidine which is a muscle relaxer.  You can take 1-2 tabs 3 times a day for back pain and spasms.  Start with just 1 tab initially.  And continue that if that is effective otherwise you can increase to 2 tabs as needed 3 times a day.    Seek immediate medical care if there is any new or  worsening symptoms.      Discharge Medication List as of 8/6/2020  1:12 PM      START taking these medications    Details   predniSONE (DELTASONE) 20 MG tablet Take two tablets (= 40mg) each day for 5 (five) days, Disp-10 tablet,R-0, E-Prescribe      tiZANidine (ZANAFLEX) 2 MG tablet Take 1-2 tabs TID PRN for back pain., Disp-15 tablet,R-0, E-Prescribe             Final diagnoses:   Lower back pain       8/6/2020   [facility]     Erin Johnson, APRN CNP  08/08/20 2040

## 2020-08-06 NOTE — DISCHARGE INSTRUCTIONS
Start your prednisone tonight around 8:00.  Complete the 5 days as directed.  Do not take any ibuprofen type products for the next 5 days.    You can take Tylenol which you can take 500 to 1000 mg every 4-6 hours.    Stop your baclofen.  You can take an as needed tizanidine which is a muscle relaxer.  You can take 1-2 tabs 3 times a day for back pain and spasms.  Start with just 1 tab initially.  And continue that if that is effective otherwise you can increase to 2 tabs as needed 3 times a day.    Seek immediate medical care if there is any new or worsening symptoms.

## 2020-08-13 NOTE — PROGRESS NOTES
Subjective     Soumya Boo is a 75 year old female who presents to clinic today for the following health issues:    HPI       ED/UC Followup:    Facility:  [facility] [place]  Date of visit: 8/6/2020  Reason for visit: Back pain. Given Toradol 15mg IM. Discharged with prednisone 40mg x5d, tizanidine 2-4mg tid prn. Stopped baclofen.   Current Status: States she is doing pretty good. Back is improving.   - requesting refill of her baclofen  - tizanidine does not work  - declined PT  - Living in [facility], sister ***     # Insomnia  - Ambien, does not take every night  - will take one to 1.5 per night when needed.    Patient Active Problem List   Diagnosis     Acute encephalopathy     Controlled substance agreement signed     Basal cell carcinoma of skin     Cancer of parotid gland (H)     Changing skin lesion     Current smoker     Elevated LFTs     Fatty liver     Fibrocystic breast     HTN (hypertension)     Hypercholesterolemia     Iron deficiency anemia     Scoliosis associated with other condition     Hx of encephalopathy     Restless legs syndrome     Insomnia, unspecified type     Past Surgical History:   Procedure Laterality Date     BIOPSY OF SKIN LESION      face     HYSTERECTOMY, PAP STILL INDICATED       wisdom teeth         Social History     Tobacco Use     Smoking status: Current Every Day Smoker     Packs/day: 0.25     Years: 50.00     Pack years: 12.50     Smokeless tobacco: Never Used   Substance Use Topics     Alcohol use: Not on file     History reviewed. No pertinent family history.      Current Outpatient Medications   Medication Sig Dispense Refill     acetaminophen-codeine (TYLENOL #3) 300-30 MG tablet        amLODIPine (NORVASC) 10 MG tablet Take 10 mg by mouth       baclofen (LIORESAL) 20 MG tablet Take 20 mg by mouth       Cholecalciferol (VITAMIN D3) 50 MCG (2000 UT) CHEW Take 2,000 Units by mouth       enalapril (VASOTEC) 10 MG tablet Take 1 tablet (10 mg) by mouth daily 90 tablet 3      hydrochlorothiazide (MICROZIDE) 12.5 MG capsule Take 12.5 mg by mouth       metoprolol tartrate (LOPRESSOR) 100 MG tablet Take 100 mg by mouth daily       Multiple Vitamins-Minerals (CENTRUM-LUTEIN OR) 1 tablet       potassium chloride ER (KLOR-CON M) 20 MEQ CR tablet Take 20 mEq by mouth       rOPINIRole (REQUIP) 0.25 MG tablet Take one at bedtime for one week then go to 2 at bedtime 60 tablet 3     simvastatin (ZOCOR) 20 MG tablet Take 1 tablet (20 mg) by mouth At Bedtime 90 tablet 3     vitamin E (TOCOPHEROL) 400 units (360 mg) capsule Take 400 Units by mouth       zolpidem (AMBIEN) 10 MG tablet TAKE 1 TABLET BY MOUTH ONCE DAILY AT NIGHT AS NEEDED FOR SLEEP 30 tablet 0     gabapentin (NEURONTIN) 100 MG capsule TAKE 3 CAPSULES BY MOUTH AT BEDTIME (Patient not taking: Reported on 8/14/2020) 90 capsule 0     gabapentin (NEURONTIN) 100 MG capsule Take 100 mg by mouth       Allergies   Allergen Reactions     Latex Rash     Extended periods of time.--Bandaids especially     BP Readings from Last 3 Encounters:   08/06/20 114/60   03/17/20 (!) 142/86    Wt Readings from Last 3 Encounters:   03/17/20 71.7 kg (158 lb)                    Reviewed and updated as needed this visit by Provider  Tobacco  Allergies  Meds  Problems  Med Hx  Surg Hx  Fam Hx         Review of Systems   Constitutional: Negative for chills and fever.   HENT: Negative for congestion.    Respiratory: Negative for shortness of breath.    Cardiovascular: Negative for chest pain and palpitations.   Gastrointestinal: Negative for abdominal pain.   Musculoskeletal: Positive for back pain.   Neurological: Positive for weakness (chronic). Negative for numbness and paresthesias.   Psychiatric/Behavioral: Positive for sleep disturbance.            Objective    There were no vitals taken for this visit.  There is no height or weight on file to calculate BMI.  Physical Exam  Constitutional:       General: She is not in acute distress.     Appearance: She  "is not ill-appearing.   Cardiovascular:      Rate and Rhythm: Normal rate and regular rhythm.      Heart sounds: No murmur.   Pulmonary:      Effort: Pulmonary effort is normal. No respiratory distress.      Breath sounds: No wheezing or rales.   Abdominal:      Tenderness: There is no abdominal tenderness.   Neurological:      Mental Status: She is alert.        {Exam List (Optional):214552}    {Diagnostic Test Results (Optional):354043::\"Diagnostic Test Results:\",\"Labs reviewed in Epic\"}        Assessment & Plan     {Diag Picklist:300543}     Tobacco Cessation:   reports that she has been smoking. She has a 12.50 pack-year smoking history. She has never used smokeless tobacco.  {Tobacco Cessation needed for ACO -- Delete if patient is a non-smoker:299870}        {FOLLOW UP PLANS (Optional):045850}    Schedule appointment with Dr. Modesto Mcguire on 9/18/2020    Dorothy Tabares MD  [facility] [place]    "

## 2020-08-14 NOTE — PROGRESS NOTES
"Soumya Boo is a 75 year old female who is being evaluated via a billable telephone visit.      The patient has been notified of following:     \"This telephone visit will be conducted via a call between you and your physician/provider. We have found that certain health care needs can be provided without the need for a physical exam.  This service lets us provide the care you need with a short phone conversation.  If a prescription is necessary we can send it directly to your pharmacy.  If lab work is needed we can place an order for that and you can then stop by our lab to have the test done at a later time.    Telephone visits are billed at different rates depending on your insurance coverage. During this emergency period, for some insurers they may be billed the same as an in-person visit.  Please reach out to your insurance provider with any questions.    If during the course of the call the physician/provider feels a telephone visit is not appropriate, you will not be charged for this service.\"    Patient has given verbal consent for Telephone visit?  Yes    What phone number would you like to be contacted at? Cell phone    How would you like to obtain your AVS? Mail a copy    Subjective     Soumya Boo is a 75 year old female who presents via phone visit today for the following health issues:    HPI      ED/UC Followup:     [place]  [facility] [place] of visit: 8/6/2020  Reason for visit: Back pain. Given Toradol 15mg IM. Discharged with prednisone 40mg x5d, tizanidine 2-4mg tid prn. Stopped baclofen.   Current Status: States she is doing pretty good. Back is improving.   - requesting refill of her baclofen  - tizanidine does not work  - declined PT  - Living in [place], sisters check in on her regularly      # Insomnia  - Ambien, does not take every night  - will take one to 1.5 per night when needed  - Discussed 1.5 is dangerous and to only take 1 tablet.    Current Outpatient Medications   Medication Sig " Dispense Refill     acetaminophen-codeine (TYLENOL #3) 300-30 MG tablet        amLODIPine (NORVASC) 10 MG tablet Take 10 mg by mouth       baclofen (LIORESAL) 20 MG tablet Take 1 tablet (20 mg) by mouth 2 times daily as needed for muscle spasms 60 tablet 0     Cholecalciferol (VITAMIN D3) 50 MCG (2000 UT) CHEW Take 2,000 Units by mouth       enalapril (VASOTEC) 10 MG tablet Take 1 tablet (10 mg) by mouth daily 90 tablet 3     hydrochlorothiazide (MICROZIDE) 12.5 MG capsule Take 12.5 mg by mouth       metoprolol tartrate (LOPRESSOR) 100 MG tablet Take 100 mg by mouth daily       Multiple Vitamins-Minerals (CENTRUM-LUTEIN OR) 1 tablet       potassium chloride ER (KLOR-CON M) 20 MEQ CR tablet Take 20 mEq by mouth       rOPINIRole (REQUIP) 0.25 MG tablet Take one at bedtime for one week then go to 2 at bedtime 60 tablet 3     simvastatin (ZOCOR) 20 MG tablet Take 1 tablet (20 mg) by mouth At Bedtime 90 tablet 3     vitamin E (TOCOPHEROL) 400 units (360 mg) capsule Take 400 Units by mouth       zolpidem (AMBIEN) 10 MG tablet TAKE 1 TABLET BY MOUTH ONCE DAILY AT NIGHT AS NEEDED FOR SLEEP 30 tablet 0     gabapentin (NEURONTIN) 100 MG capsule TAKE 3 CAPSULES BY MOUTH AT BEDTIME (Patient not taking: Reported on 8/14/2020) 90 capsule 0     gabapentin (NEURONTIN) 100 MG capsule Take 100 mg by mouth       Allergies   Allergen Reactions     Latex Rash     Extended periods of time.--Bandaids especially       Reviewed and updated as needed this visit by Provider  Tobacco  Allergies  Meds  Problems  Med Hx  Surg Hx  Fam Hx         Review of Systems   Constitutional: Negative for chills and fever.   HENT: Negative for congestion.    Respiratory: Negative for shortness of breath.    Cardiovascular: Negative for chest pain and palpitations.   Gastrointestinal: Negative for abdominal pain.   Musculoskeletal: Positive for back pain.   Neurological: Positive for weakness (chronic). Negative for numbness and paresthesias.    Psychiatric/Behavioral: Positive for sleep disturbance.        Objective      Vitals:  No vitals were obtained today due to virtual visit.    healthy, alert and no distress  PSYCH: Alert and oriented times 3; coherent speech, normal   rate and volume, able to articulate logical thoughts, able   to abstract reason, no tangential thoughts, no hallucinations   or delusions  Her affect is normal  RESP: No cough, no audible wheezing, able to talk in full sentences  Remainder of exam unable to be completed due to telephone visits    Diagnostic Test Results:  none         Assessment/Plan:    Assessment & Plan     1. Chronic low back pain, unspecified back pain laterality, unspecified whether sciatica present  Improving. Declines PT  - baclofen (LIORESAL) 20 MG tablet; Take 1 tablet (20 mg) by mouth 2 times daily as needed for muscle spasms  Dispense: 60 tablet; Refill: 0  - discontinue tizandine    2. Insomnia, unspecified type  Discussed taking only one tablet maximum.   - zolpidem (AMBIEN) 10 MG tablet; TAKE 1 TABLET BY MOUTH ONCE DAILY AT NIGHT AS NEEDED FOR SLEEP  Dispense: 30 tablet; Refill: 0  - MN  verified       See Patient Instructions    Appointment with Dr. Mcguire on 9/18/2020    Dorothy Tabares MD  [facility] [place]    Phone call duration:  6 minutes

## 2020-08-14 NOTE — NURSING NOTE
"Chief Complaint   Patient presents with     ER F/U       Initial There were no vitals taken for this visit. Estimated body mass index is 24.02 kg/m  as calculated from the following:    Height as of 3/17/20: 1.727 m (5' 8\").    Weight as of 3/17/20: 71.7 kg (158 lb).  Medication Reconciliation: complete  Indy De La Torre LPN  "

## 2020-09-11 NOTE — PROGRESS NOTES
Subjective     Soumya Boo is a 75 year old female who presents to clinic today for the following health issues:    HPI       Hyperlipidemia Follow-Up      Are you regularly taking any medication or supplement to lower your cholesterol?   Yes- simvastatin    Are you having muscle aches or other side effects that you think could be caused by your cholesterol lowering medication?  No    Hypertension Follow-up      Do you check your blood pressure regularly outside of the clinic? No     Are you following a low salt diet? No    Are your blood pressures ever more than 140 on the top number (systolic) OR more   than 90 on the bottom number (diastolic), for example 140/90? No  Patient's had intermittent tremor for the last several months just at rest no intentional tremor.  No headache or vision change.  Insomnia  Onset: months    Description:   Time to fall asleep (sleep latency): 2 hours  Middle of night awakening:  YES  Early morning awakening:  YES    Progression of Symptoms:  worsening    Accompanying Signs & Symptoms:  Daytime sleepiness/napping: no  Excessive snoring/apnea: no  Restless legs: YES  Frequent urination: no  Chronic pain:  YES    History:  Prior Insomnia: no    Precipitating factors:   New stressful situation: no  Caffeine intake: YES  OTC decongestants: no  Any new medications: no     Alleviating factors:  Self medicating (alcohol, etc.):  no    Therapies Tried and outcome: ambien-helps minimally    Patient was seen at [facility] earlier this year with her stroke and had a scalp lesion was told to follow-up.        Review of Systems   Constitutional, HEENT, cardiovascular, pulmonary, gi and gu systems are negative, except as otherwise noted.      Objective    There were no vitals taken for this visit.  There is no height or weight on file to calculate BMI.  Physical Exam   GENERAL: healthy, alert and no distress  EYES: Eyes grossly normal to inspection, PERRL and conjunctivae and sclerae  normal  NECK: no adenopathy, no asymmetry, masses, or scars and thyroid normal to palpation  RESP: lungs clear to auscultation - no rales, rhonchi or wheezes  CV: regular rate and rhythm, normal S1 S2, no S3 or S4, no murmur, click or rub, no peripheral edema and peripheral pulses strong  ABDOMEN: soft, nontender, no hepatosplenomegaly, no masses and bowel sounds normal  MS: No obvious tremor or focal weakness now  SKIN: Has a scabbed over area on her scalp no tenderness no surrounding redness or purulent material            Assessment & Plan     Need for prophylactic vaccination and inoculation against influenza    - FLUZONE HIGH DOSE 65+  [11022]  - Vaccine Administration, Initial [75132]    Hypercholesterolemia    - Lipid Profile    Hypertension, unspecified type    - Comprehensive metabolic panel    Insomnia, unspecified type    - zolpidem ER (AMBIEN CR) 6.25 MG CR tablet  Dispense: 30 tablet; Refill: 0    Scalp lesion    - DERMATOLOGY ADULT REFERRAL    Tremor    - TSH with free T4 reflex  Patient needs a flu shot that was given today.  Is fasting we will check lipids follow-up hypercholesterolemia and has hypertension under good control we will check a CMP.  She found some improvement with the Ambien but she would sleep for few hours and wake up we will try the controlled release form.  Has a scalp lesion and its possible could be a deeper fungal lesion we will set her up to see dermatology.  For this intermittent tremor in her hands will check it TSH.     Tobacco Cessation:   reports that she has been smoking. She has a 12.50 pack-year smoking history. She has never used smokeless tobacco.                  ANA LAURA Mcguire MD  [facility] [place]

## 2020-09-18 NOTE — NURSING NOTE
"Chief Complaint   Patient presents with     Hyperlipidemia     Hypertension       Initial /70   Pulse 70   SpO2 97%  Estimated body mass index is 24.02 kg/m  as calculated from the following:    Height as of 3/17/20: 1.727 m (5' 8\").    Weight as of 3/17/20: 71.7 kg (158 lb).  Medication Reconciliation: complete  Nohemi Combs LPN  "

## 2020-09-28 NOTE — TELEPHONE ENCOUNTER
Spoke with pt. Sugars just in the prediabetic range, pt decline visit with DRC, will work on diet and exercise and agrees to check levels again in 3 months.

## 2020-09-28 NOTE — TELEPHONE ENCOUNTER
2:33 PM    Reason for Call: Lab Results - 9/18/20    Description: call from pt requesting lab results from 9/18/20    Was an appointment offered for this call? No  If yes : Appointment type              Date    Preferred method for responding to this message: Telephone Call  What is your phone number ? [phone]    If we cannot reach you directly, may we leave a detailed response at the number you provided? Yes    Can this message wait until your PCP/provider returns, if available today? YES      Jacinda De La Vega RN

## 2020-10-27 NOTE — TELEPHONE ENCOUNTER
gabapentin (NEURONTIN) 100 MG capsule        Last Written Prescription Date:  7/10/20  Last Fill Quantity: 90,   # refills: 0  Last Office Visit: 9/18/20  Future Office visit:       Routing refill request to provider for review/approval because:    Drug not on the [facility], [facility] or [facility] refill protocol or controlled substance

## 2020-10-27 NOTE — TELEPHONE ENCOUNTER
BACLOFEN      Last Written Prescription Date:  8-  Last Fill Quantity: 60,   # refills: 0  Last Office Visit: 8-  Future Office visit:       Routing refill request to provider for review/approval because:

## 2020-11-20 NOTE — TELEPHONE ENCOUNTER
ambien      Last Written Prescription Date:  8/14/20  Last Fill Quantity: 30,   # refills: 0  Last Office Visit: 9/18/20  Future Office visit:       Routing refill request to provider for review/approval because:  Drug not on the [facility], [facility] or [facility] refill protocol or controlled substance

## 2020-11-21 NOTE — TELEPHONE ENCOUNTER
Prior Authorization Retail Medication Request  [facility] KEY# [id number]    Medication/Dose: ZOLPIDEM TARTRATE 6.25MG ER TABLETS  ICD code (if different than what is on RX):    Previously Tried and Failed:    Rationale:      Insurance Name:    Insurance ID:        Pharmacy Information (if different than what is on RX)  Name:  [facility]  Phone:  [phone]

## 2020-11-23 NOTE — TELEPHONE ENCOUNTER
Central Prior Authorization Team   Phone: [phone]      PA Initiation    Medication: ZOLPIDEM TARTRATE 6.25MG ER TABLETS-Initiated  Insurance Company: [employer] - Phone [phone] Fax [phone]  Pharmacy Filling the Rx: [facility] [address] [place] [address]   Filling Pharmacy Phone: [phone]  Filling Pharmacy Fax:    Start Date: 11/23/2020

## 2020-11-23 NOTE — TELEPHONE ENCOUNTER
Tylenol #3      Last Written Prescription Date:  9/21/2020  Last Fill Quantity: 30,   # refills: 0  Last Office Visit: 9/18/2020  Future Office visit:

## 2020-11-23 NOTE — TELEPHONE ENCOUNTER
Prior Authorization Approval    Authorization Effective Date: 1/1/2020  Authorization Expiration Date: 12/31/2021  Medication: ZOLPIDEM TARTRATE 6.25MG ER TABLETS-APPROVED  Approved Dose/Quantity:   Reference #:     Insurance Company: [employer] [phone] Fax [phone]  Expected CoPay:       CoPay Card Available:      Foundation Assistance Needed:    Which Pharmacy is filling the prescription (Not needed for infusion/clinic administered): [facility] [address] [facility] [address] [facility] [address]  Pharmacy Notified: Yes  Patient Notified: No    Pharmacy will notify patient when medication is ready.

## 2020-11-30 NOTE — LETTER
2020       RE: Luis Miguel Phoenix  [address]  [facility] [address]     Dear Colleague,    Thank you for referring your patient, Luis Miguel Phoenix, to the [facility]. Please see a copy of my visit note below.    Visit Date:   2020      SUBJECTIVE:  First visit for Bethanie who is referred to us by Dr. QI Mcguire.  She has developed a lesion on the top of her scalp is quite concerning.      OBJECTIVE:  Exam shows several centimeter blackish crusted oval lesion on the posterior scalp that is to me suggestive of the possibility of an irritated  seborrheic keratosis or more likely a squamous cell carcinoma with a well-differentiated features. The black oval crusting surrounds an area of smooth skin clearing.   Apparently this has been present for a number of months.  She lives alone in [place].       ASSESSMENT:  Rule out skin cancer, scalp.       PLAN:  Lesion photographed. Then with anesthesia, I removed  a 6-8 mm specimen  for purposes of biopsy.  We will call her with the report.  If this is squamous cell carcinoma may have her return for curettage if it is well-differentiated for ease of treatment, but  if more serious or anaplastic then will need an excision by one of our surgeons.  She should also return to me for a more complete exam of her skin.      MEDICATIONS AND ALLERGIES:  Reviewed.         VERONICA CURRY MD             D: 2020   T: 2020   MT: MAICOL      Name:     LUIS MIGUEL PHOENIX   MRN:      [id number]        Account:      [id number]   :      1945           Visit Date:   2020      Document: [id number]       cc: ANA LAURA Mcguire MD         Again, thank you for allowing me to participate in the care of your patient.      Sincerely,    VERONICA Curry MD

## 2020-11-30 NOTE — NURSING NOTE
"Chief Complaint   Patient presents with     Derm Problem     scalp lesion       Initial /60 (BP Location: Right arm, Patient Position: Chair, Cuff Size: Adult Regular)   Pulse 74   Temp 96.2  F (35.7  C) (Tympanic)   Ht 1.727 m (5' 8\")   Wt 69.4 kg (153 lb)   SpO2 96%   BMI 23.26 kg/m   Estimated body mass index is 23.26 kg/m  as calculated from the following:    Height as of this encounter: 1.727 m (5' 8\").    Weight as of this encounter: 69.4 kg (153 lb).  Medication Reconciliation: complete  TILA JEROME LPN    "

## 2020-12-01 NOTE — PROGRESS NOTES
Visit Date:   2020      SUBJECTIVE:  First visit for Bethanie who is referred to us by Dr. QI Mcguire.  She has developed a lesion on the top of her scalp is quite concerning.      OBJECTIVE:  Exam shows several centimeter blackish crusted oval lesion on the posterior scalp that is to me suggestive of the possibility of an irritated  seborrheic keratosis or more likely a squamous cell carcinoma with a well-differentiated features. The black oval crusting surrounds an area of smooth skin clearing.   Apparently this has been present for a number of months.  She lives alone in [place].       ASSESSMENT:  Rule out skin cancer, scalp.       PLAN:  Lesion photographed. Then with anesthesia, I removed  a 6-8 mm specimen  for purposes of biopsy.  We will call her with the report.  If this is squamous cell carcinoma may have her return for curettage if it is well-differentiated for ease of treatment, but  if more serious or anaplastic then will need an excision by one of our surgeons.  She should also return to me for a more complete exam of her skin.      MEDICATIONS AND ALLERGIES:  Reviewed.         VERONICA FINNEY MD             D: 2020   T: 2020   MT: MAICOL      Name:     LUIS MIGUEL PHOENIX   MRN:      [id number]        Account:      [id number]   :      1945           Visit Date:   2020      Document: [id number]       cc: ANA LAURA Mcguire MD

## 2020-12-16 NOTE — TELEPHONE ENCOUNTER
Patient called regarding the results of the pathology.  Sent patient to scheduling to set up an appointment for January when Patricio is here again.

## 2020-12-21 NOTE — TELEPHONE ENCOUNTER
Ambien  Last Written Prescription Date: 11/20/20  Last Fill Quantity: 30 # of Refills: 0  Last Office Visit: 9/18/20    Gabapentin  Last Written Prescription Date: 10/27/20  Last Fill Quantity: 90 # of Refills: 0  Last Office Visit: 9/18/20

## 2021-01-01 ENCOUNTER — TRANSFERRED RECORDS (OUTPATIENT)
Dept: HEALTH INFORMATION MANAGEMENT | Facility: CLINIC | Age: 76
End: 2021-01-01

## 2021-01-01 ENCOUNTER — OFFICE VISIT (OUTPATIENT)
Dept: DERMATOLOGY | Facility: OTHER | Age: 76
End: 2021-01-01
Attending: DERMATOLOGY
Payer: MEDICARE

## 2021-01-01 VITALS
SYSTOLIC BLOOD PRESSURE: 134 MMHG | RESPIRATION RATE: 16 BRPM | DIASTOLIC BLOOD PRESSURE: 74 MMHG | TEMPERATURE: 97.5 F | OXYGEN SATURATION: 98 % | HEART RATE: 100 BPM

## 2021-01-01 DIAGNOSIS — Z85.828 HISTORY OF BASAL CELL CARCINOMA: ICD-10-CM

## 2021-01-01 DIAGNOSIS — G89.29 CHRONIC LOW BACK PAIN, UNSPECIFIED BACK PAIN LATERALITY, UNSPECIFIED WHETHER SCIATICA PRESENT: ICD-10-CM

## 2021-01-01 DIAGNOSIS — L98.9 SCALP LESION: Primary | ICD-10-CM

## 2021-01-01 DIAGNOSIS — D48.5 NEOPLASM OF UNCERTAIN BEHAVIOR OF SKIN: ICD-10-CM

## 2021-01-01 DIAGNOSIS — G25.81 RESTLESS LEGS SYNDROME: ICD-10-CM

## 2021-01-01 DIAGNOSIS — M54.50 CHRONIC LOW BACK PAIN, UNSPECIFIED BACK PAIN LATERALITY, UNSPECIFIED WHETHER SCIATICA PRESENT: ICD-10-CM

## 2021-01-01 DIAGNOSIS — G47.00 INSOMNIA, UNSPECIFIED TYPE: ICD-10-CM

## 2021-01-01 LAB — COPATH REPORT: NORMAL

## 2021-01-01 PROCEDURE — 17110 DESTRUCTION B9 LES UP TO 14: CPT

## 2021-01-01 PROCEDURE — 88305 TISSUE EXAM BY PATHOLOGIST: CPT | Mod: TC | Performed by: DERMATOLOGY

## 2021-01-01 PROCEDURE — 11104 PUNCH BX SKIN SINGLE LESION: CPT | Mod: 59

## 2021-01-01 PROCEDURE — G0463 HOSPITAL OUTPT CLINIC VISIT: HCPCS

## 2021-01-01 PROCEDURE — 11104 PUNCH BX SKIN SINGLE LESION: CPT | Mod: 59 | Performed by: DERMATOLOGY

## 2021-01-01 PROCEDURE — 17110 DESTRUCTION B9 LES UP TO 14: CPT | Performed by: DERMATOLOGY

## 2021-01-01 RX ORDER — GABAPENTIN 100 MG/1
CAPSULE ORAL
Qty: 90 CAPSULE | Refills: 0 | Status: SHIPPED | OUTPATIENT
Start: 2021-01-01

## 2021-01-01 RX ORDER — ZOLPIDEM TARTRATE 6.25 MG/1
TABLET, FILM COATED, EXTENDED RELEASE ORAL
Qty: 30 TABLET | Refills: 0 | Status: SHIPPED | OUTPATIENT
Start: 2021-01-01

## 2021-01-01 RX ORDER — BACLOFEN 20 MG/1
TABLET ORAL
Qty: 60 TABLET | Refills: 0 | Status: SHIPPED | OUTPATIENT
Start: 2021-01-01

## 2021-01-01 ASSESSMENT — PAIN SCALES - GENERAL: PAINLEVEL: NO PAIN (0)

## 2021-01-07 NOTE — TELEPHONE ENCOUNTER
Tylenol #3 300-30 mg      Last Written Prescription Date:  11/23/20  Last Fill Quantity: 30,   # refills: 0  Last Office Visit:9/18/20  Future Office visit:    Next 5 appointments (look out 90 days)    Jan 11, 2021  3:00 PM  (Arrive by 2:45 PM)  Return Visit with VERONICA Curry MD  [facility] ([facility] ) [address] [facility] [place] [address]  [phone]           Routing refill request to provider for review/approval because:      Baclofen  20 mg  Last Written Prescription Date:  10/27/20  Last Fill Quantity: 60,   # refills: 0  Last Office Visit: 9/18/20  Future Office visit:    Next 5 appointments (look out 90 days)    Jan 11, 2021  3:00 PM  (Arrive by 2:45 PM)  Return Visit with VERONICA Curry MD  [facility] ([facility] ) [address] [place] [address] [phone]           Routing refill request to provider for review/approval because:

## 2021-01-11 NOTE — LETTER
1/11/2021       RE: Soumya Boo  [address]  [facility] [address]     Dear Colleague,    Thank you for referring your patient, Soumya Boo, to the [facility]. Please see a copy of my visit note below.    Visit Date:   01/11/2021      SUBJECTIVE:  Mrs. Boo returns for treatment of the scalp lesions.  At last visit, we biopsied the scalp, and this was returned as a basal cell carcinoma.      OBJECTIVE:  This central lesion that we biopsied is the one that roughly measures 4 cm on the top of her scalp. It presents today as a keratotic, round crusted lesion. We only removed about half of it with the biopsy.  There is no evidence of any infection in and around the site of biopsy.  Posterior to it, further down the scalp, is a bright-red, raised,  nodule that is also probably a basal cell.  It has a smooth surface. There are 2 more lesions nearby that are crusted and could be the same thing, but I felt today we should treat the previously biopsied area of 4 cm and then remove and treat the 1 cm lesion that I did not note at last visit.      ASSESSMENT:     1.  Basal cell carcinoma.   2.  Lesion of unclear nature of the posterior scalp but likely another basal cell.      PLAN:  The areas mentioned were both anesthetized with lidocaine with sodium bicarbonate.  I then curetted out the abnormal friable tissue from the top lesion first.  Ferric chloride was used for hemostatis and pressure applied to obtain hemostasis.   The smaller, 1 cm lesion was biopsied and treated similarly.  The specimen from the 1 cm area was sent for pathology.      For aftercare, I recommended Hibiclens soaks twice daily and the use of Vaseline following the soaks.  The soaks should be 10-15 minutes.  I did not recommend any bandaging, as this would be quite difficult for her.  I believe she lives alone at home and is not in a nursing facility.  I forgot to  inquire whether she might live with her sister, who is her family contact.      Return in 1 month.  I advised that she call the clinic if there should develop any severe swelling or pain or evidence of infection.      MEDICATIONS AND ALLERGIES:  Reviewed again.         VERONICA FINNEY MD             D: 2021   T: 2021   MT: HANNA      Name:     LUIS MIGUEL PHOENIX   MRN:      [id number]        Account:      [id number]   :      1945           Visit Date:   2021      Document: [id number]          Again, thank you for allowing me to participate in the care of your patient.      Sincerely,    VERONICA Finney MD

## 2021-01-11 NOTE — NURSING NOTE
"Chief Complaint   Patient presents with     Hair/Scalp Problem       Initial /74   Pulse 100   Temp 97.5  F (36.4  C) (Tympanic)   Resp 16   SpO2 98%  Estimated body mass index is 23.26 kg/m  as calculated from the following:    Height as of 11/30/20: 1.727 m (5' 8\").    Weight as of 11/30/20: 69.4 kg (153 lb).  Medication Reconciliation: complete  Kim Quesada LPN    "

## 2021-01-11 NOTE — PATIENT INSTRUCTIONS
Today we removed much of the basal cell skin cancer from the large area on the top of the scalp - we used a method called curettage which means scraping out the cancer cells.     We also removed and biopsied a smaller lesion behind the larger area.     For wound care, dilute the Hibiclens 3 parts to 1 part tapwater and soak the treatment sites twice daily for 10 to 15 minutes. Then apply some vaseline to the sites.     Call the clinic if you develop severe pain around the sites of treatment or have questions.    I would like to see you when I am here next month as we may have to do some touch up or remove a few more areas.

## 2021-01-12 NOTE — PROGRESS NOTES
Visit Date:   01/11/2021      SUBJECTIVE:  Mrs. Boo returns for treatment of the scalp lesions.  At last visit, we biopsied the scalp, and this was returned as a basal cell carcinoma.      OBJECTIVE:  This central lesion that we biopsied is the one that roughly measures 4 cm on the top of her scalp. It presents today as a keratotic, round crusted lesion. We only removed about half of it with the biopsy.  There is no evidence of any infection in and around the site of biopsy.  Posterior to it, further down the scalp, is a bright-red, raised,  nodule that is also probably a basal cell.  It has a smooth surface. There are 2 more lesions nearby that are crusted and could be the same thing, but I felt today we should treat the previously biopsied area of 4 cm and then remove and treat the 1 cm lesion that I did not note at last visit.      ASSESSMENT:     1.  Basal cell carcinoma.   2.  Lesion of unclear nature of the posterior scalp but likely another basal cell.      PLAN:  The areas mentioned were both anesthetized with lidocaine with sodium bicarbonate.  I then curetted out the abnormal friable tissue from the top lesion first.  Ferric chloride was used for hemostatis and pressure applied to obtain hemostasis.   The smaller, 1 cm lesion was biopsied and treated similarly.  The specimen from the 1 cm area was sent for pathology.      For aftercare, I recommended Hibiclens soaks twice daily and the use of Vaseline following the soaks.  The soaks should be 10-15 minutes.  I did not recommend any bandaging, as this would be quite difficult for her.  I believe she lives alone at home and is not in a nursing facility.  I forgot to inquire whether she might live with her sister, who is her family contact.      Return in 1 month.  I advised that she call the clinic if there should develop any severe swelling or pain or evidence of infection.      MEDICATIONS AND ALLERGIES:  Reviewed again.         VERONICA FINNEY MD              D: 2021   T: 2021   MT: HANNA      Name:     LUIS MIGUEL PHOENIX   MRN:      [id number]        Account:      [id number]   :      1945           Visit Date:   2021      Document: [id number]

## 2021-01-20 NOTE — TELEPHONE ENCOUNTER
AMBIEN      Last Written Prescription Date:  12.21.2020  Last Fill Quantity: 30,   # refills: 0  Last Office Visit: 01/11/2021

## 2021-02-01 NOTE — TELEPHONE ENCOUNTER
gabapentin      Last Written Prescription Date:  12/21/2020  Last Fill Quantity: 90,   # refills: 0  Last Office Visit: 9/18/2020  Future Office visit:    Next 5 appointments (look out 90 days)    Feb 08, 2021  1:00 PM  (Arrive by 12:45 PM)  Return Visit with VERONICA Curry MD  [facility] ([facility] ) [address] [place] [address] [phone]